# Patient Record
Sex: FEMALE | Race: WHITE | NOT HISPANIC OR LATINO | Employment: OTHER | URBAN - METROPOLITAN AREA
[De-identification: names, ages, dates, MRNs, and addresses within clinical notes are randomized per-mention and may not be internally consistent; named-entity substitution may affect disease eponyms.]

---

## 2018-04-04 NOTE — PATIENT DISCUSSION
minimal left eye.  one single blot heme.  Discussed the importance of blood pressure control in the prevention of ocular complications.

## 2018-12-03 ENCOUNTER — NEW PATIENT COMPREHENSIVE (OUTPATIENT)
Dept: URBAN - METROPOLITAN AREA CLINIC 46 | Facility: CLINIC | Age: 48
End: 2018-12-03

## 2018-12-03 DIAGNOSIS — H52.7: ICD-10-CM

## 2018-12-03 DIAGNOSIS — Z46.0: ICD-10-CM

## 2018-12-03 PROCEDURE — 92004 COMPRE OPH EXAM NEW PT 1/>: CPT

## 2018-12-03 PROCEDURE — 92310PDW PREMIUM SPECIALTY DAILY WEAR

## 2018-12-03 PROCEDURE — 92310-1 LEVEL 1 CONTACT LENS MANAGEMENT

## 2018-12-03 PROCEDURE — 92015 DETERMINE REFRACTIVE STATE: CPT

## 2018-12-03 ASSESSMENT — VISUAL ACUITY
OD_CC: J4
OD_CC: 20/20-1
OS_CC: 20/25
OS_CC: J5

## 2018-12-03 ASSESSMENT — TONOMETRY
OD_IOP_MMHG: 15
OS_IOP_MMHG: 15

## 2020-02-24 ENCOUNTER — ESTABLISHED COMPREHENSIVE EXAM (OUTPATIENT)
Dept: URBAN - METROPOLITAN AREA CLINIC 46 | Facility: CLINIC | Age: 50
End: 2020-02-24

## 2020-02-24 DIAGNOSIS — Z46.0: ICD-10-CM

## 2020-02-24 DIAGNOSIS — H52.7: ICD-10-CM

## 2020-02-24 PROCEDURE — 92310-1 LEVEL 1 CONTACT LENS MANAGEMENT

## 2020-02-24 PROCEDURE — 92015 DETERMINE REFRACTIVE STATE: CPT

## 2020-02-24 PROCEDURE — 92014 COMPRE OPH EXAM EST PT 1/>: CPT

## 2020-02-24 ASSESSMENT — VISUAL ACUITY
OD_CC: 20/20 W CL
OS_SC: 20/200
OD_SC: 20/100-1
OD_CC: J2
OS_SC: J1
OS_OTHER: FL+.50
OD_OTHER: FL+.25
OS_CC: J1
OD_SC: J2

## 2020-02-24 ASSESSMENT — TONOMETRY
OS_IOP_MMHG: 15
OD_IOP_MMHG: 15

## 2021-09-20 ENCOUNTER — ESTABLISHED COMPREHENSIVE EXAM (OUTPATIENT)
Dept: URBAN - METROPOLITAN AREA CLINIC 46 | Facility: CLINIC | Age: 51
End: 2021-09-20

## 2021-09-20 DIAGNOSIS — H52.7: ICD-10-CM

## 2021-09-20 DIAGNOSIS — Z46.0: ICD-10-CM

## 2021-09-20 PROCEDURE — 92014 COMPRE OPH EXAM EST PT 1/>: CPT

## 2021-09-20 PROCEDURE — 92015 DETERMINE REFRACTIVE STATE: CPT

## 2021-09-20 PROCEDURE — 92310-1 LEVEL 1 CONTACT LENS MANAGEMENT

## 2021-09-20 ASSESSMENT — VISUAL ACUITY
OD_CC: 20/30-2
OD_CC: J3
OS_CC: 20/50-1
OS_CC: J5

## 2021-09-20 ASSESSMENT — TONOMETRY
OS_IOP_MMHG: 12
OD_IOP_MMHG: 12

## 2022-01-26 NOTE — PATIENT DISCUSSION
1/26/2022:  pt had CVA 1/14/2022.  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  we can repeat this to confirm.  Ideally we would confirm this anatomically on MRI.  letter to Dr Teo Eaton as he is the neuro overseeing her care.

## 2022-11-15 NOTE — PATIENT DISCUSSION
1/26/2022:  pt had CVA 1/14/2022.  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  we can repeat this to confirm.  Ideally we would confirm this anatomically on MRI.  letter to Dr Kylah Solorio as he is the neuro overseeing her care.

## 2022-11-15 NOTE — PATIENT DISCUSSION
Glasses Rx stable.  at [facility] in glasses.  I ed wear glasses for driving. Reason for call:  Medication   If this is a refill request, has the caller requested the refill from the pharmacy already? No  Will the patient be using a [facility]? No  Name of the pharmacy and phone number for the current request: [id number] [id number] [place] [address]  Travel screening: Not Applicable         Shakeel MTZ    [facility]

## 2024-10-19 ENCOUNTER — COMPREHENSIVE EXAM (OUTPATIENT)
Dept: URBAN - METROPOLITAN AREA CLINIC 46 | Facility: CLINIC | Age: 54
End: 2024-10-19

## 2024-10-19 DIAGNOSIS — Z97.3: ICD-10-CM

## 2024-10-19 DIAGNOSIS — H52.7: ICD-10-CM

## 2024-10-19 PROCEDURE — 92015 DETERMINE REFRACTIVE STATE: CPT

## 2024-10-19 PROCEDURE — 92004 COMPRE OPH EXAM NEW PT 1/>: CPT

## 2024-10-19 PROCEDURE — 92310-1 LEVEL 1 SOFT LENS UPDATE
